# Patient Record
Sex: FEMALE | Race: BLACK OR AFRICAN AMERICAN | NOT HISPANIC OR LATINO | URBAN - METROPOLITAN AREA
[De-identification: names, ages, dates, MRNs, and addresses within clinical notes are randomized per-mention and may not be internally consistent; named-entity substitution may affect disease eponyms.]

---

## 2023-03-01 PROBLEM — I10 BENIGN ESSENTIAL HYPERTENSION: Status: ACTIVE | Noted: 2023-03-01

## 2023-03-01 PROBLEM — M75.80 ROTATOR CUFF TENDINITIS: Status: RESOLVED | Noted: 2023-03-01 | Resolved: 2023-03-01

## 2023-03-01 PROBLEM — E78.00 HYPERCHOLESTEROLEMIA: Status: ACTIVE | Noted: 2023-03-01

## 2023-03-01 PROBLEM — L25.9 DERMATITIS VENENATA: Status: RESOLVED | Noted: 2023-03-01 | Resolved: 2023-03-01

## 2023-03-01 PROBLEM — L50.8 URTICARIA, CHRONIC: Status: RESOLVED | Noted: 2023-03-01 | Resolved: 2023-03-01

## 2023-03-01 RX ORDER — AMLODIPINE BESYLATE 5 MG/1
1 TABLET ORAL DAILY
COMMUNITY
Start: 2016-03-31 | End: 2023-04-03 | Stop reason: SDUPTHER

## 2023-03-01 RX ORDER — NYSTATIN 100000 U/G
1 CREAM TOPICAL 2 TIMES DAILY
COMMUNITY
Start: 2022-04-04 | End: 2023-04-03 | Stop reason: SDUPTHER

## 2023-03-01 RX ORDER — POLYETHYLENE GLYCOL 3350, SODIUM CHLORIDE, SODIUM BICARBONATE, POTASSIUM CHLORIDE 420; 11.2; 5.72; 1.48 G/4L; G/4L; G/4L; G/4L
4000 POWDER, FOR SOLUTION ORAL ONCE
COMMUNITY
Start: 2022-06-04 | End: 2023-10-23

## 2023-03-01 RX ORDER — MULTIVITAMIN
1 TABLET ORAL DAILY
COMMUNITY

## 2023-03-01 RX ORDER — TRIAMCINOLONE ACETONIDE 1 MG/G
1 OINTMENT TOPICAL 2 TIMES DAILY
COMMUNITY
Start: 2022-04-04 | End: 2023-04-03 | Stop reason: SDUPTHER

## 2023-03-01 RX ORDER — CHOLECALCIFEROL (VITAMIN D3) 25 MCG
1 TABLET ORAL DAILY
COMMUNITY

## 2023-04-03 ENCOUNTER — OFFICE VISIT (OUTPATIENT)
Dept: PRIMARY CARE | Facility: CLINIC | Age: 68
End: 2023-04-03
Payer: MEDICARE

## 2023-04-03 ENCOUNTER — LAB (OUTPATIENT)
Dept: LAB | Facility: LAB | Age: 68
End: 2023-04-03
Payer: MEDICARE

## 2023-04-03 VITALS
TEMPERATURE: 98.4 F | WEIGHT: 138 LBS | BODY MASS INDEX: 23.56 KG/M2 | HEIGHT: 64 IN | SYSTOLIC BLOOD PRESSURE: 127 MMHG | DIASTOLIC BLOOD PRESSURE: 88 MMHG

## 2023-04-03 DIAGNOSIS — I10 BENIGN ESSENTIAL HYPERTENSION: Primary | ICD-10-CM

## 2023-04-03 DIAGNOSIS — Z00.00 ENCOUNTER FOR SUBSEQUENT ANNUAL WELLNESS VISIT (AWV) IN MEDICARE PATIENT: ICD-10-CM

## 2023-04-03 DIAGNOSIS — R21 FACIAL RASH: ICD-10-CM

## 2023-04-03 DIAGNOSIS — E78.00 HYPERCHOLESTEROLEMIA: ICD-10-CM

## 2023-04-03 DIAGNOSIS — I10 BENIGN ESSENTIAL HYPERTENSION: ICD-10-CM

## 2023-04-03 PROCEDURE — 3074F SYST BP LT 130 MM HG: CPT | Performed by: INTERNAL MEDICINE

## 2023-04-03 PROCEDURE — 3079F DIAST BP 80-89 MM HG: CPT | Performed by: INTERNAL MEDICINE

## 2023-04-03 PROCEDURE — 99213 OFFICE O/P EST LOW 20 MIN: CPT | Performed by: INTERNAL MEDICINE

## 2023-04-03 PROCEDURE — 85027 COMPLETE CBC AUTOMATED: CPT

## 2023-04-03 PROCEDURE — 1036F TOBACCO NON-USER: CPT | Performed by: INTERNAL MEDICINE

## 2023-04-03 PROCEDURE — 36415 COLL VENOUS BLD VENIPUNCTURE: CPT

## 2023-04-03 PROCEDURE — 80053 COMPREHEN METABOLIC PANEL: CPT

## 2023-04-03 PROCEDURE — G0439 PPPS, SUBSEQ VISIT: HCPCS | Performed by: INTERNAL MEDICINE

## 2023-04-03 PROCEDURE — 80061 LIPID PANEL: CPT

## 2023-04-03 PROCEDURE — 84443 ASSAY THYROID STIM HORMONE: CPT

## 2023-04-03 RX ORDER — CETIRIZINE HYDROCHLORIDE 5 MG/1
1 TABLET ORAL DAILY
COMMUNITY
End: 2023-10-23

## 2023-04-03 RX ORDER — AMLODIPINE BESYLATE 5 MG/1
5 TABLET ORAL DAILY
Qty: 90 TABLET | Refills: 3 | Status: SHIPPED | OUTPATIENT
Start: 2023-04-03 | End: 2024-04-01 | Stop reason: SDUPTHER

## 2023-04-03 RX ORDER — TRIAMCINOLONE ACETONIDE 1 MG/G
1 OINTMENT TOPICAL 2 TIMES DAILY
Qty: 20 G | Refills: 2 | Status: SHIPPED | OUTPATIENT
Start: 2023-04-03

## 2023-04-03 RX ORDER — NYSTATIN 100000 U/G
1 CREAM TOPICAL AS NEEDED
Qty: 20 G | Refills: 1 | Status: SHIPPED | OUTPATIENT
Start: 2023-04-03 | End: 2023-10-23

## 2023-04-03 RX ORDER — CYANOCOBALAMIN (VITAMIN B-12) 500 MCG
1 TABLET ORAL DAILY
COMMUNITY
End: 2023-10-23

## 2023-04-03 ASSESSMENT — ENCOUNTER SYMPTOMS
HEADACHES: 0
MYALGIAS: 0
BLOOD IN STOOL: 0
COUGH: 0
DEPRESSION: 0
CHILLS: 0
SHORTNESS OF BREATH: 0
PALPITATIONS: 0
ARTHRALGIAS: 0
DIZZINESS: 0
LOSS OF SENSATION IN FEET: 0
CONSTIPATION: 0
OCCASIONAL FEELINGS OF UNSTEADINESS: 0
DIARRHEA: 0
BACK PAIN: 0
FEVER: 0
FATIGUE: 0
NECK PAIN: 0
ABDOMINAL PAIN: 0

## 2023-04-03 NOTE — PATIENT INSTRUCTIONS
You are seen today for Medicare wellness visit  Please go to the lab for fasting blood work  Continue taking amlodipine  Continue with regular physical activity  Prevnar vaccine and Shingrix vaccine recommended

## 2023-04-03 NOTE — PROGRESS NOTES
Subjective   Patient ID: Richa Santoro is a 67 y.o. female.     HPI seen for wellness visit  She has hypertension and takes amlodipine.  No other health concerns today.    Mammogram 2022  Colonoscopy 2022  DEXA 2021  She lives with her     Review of Systems   Constitutional:  Negative for chills, fatigue and fever.   Respiratory:  Negative for cough and shortness of breath.    Cardiovascular:  Negative for chest pain, palpitations and leg swelling.   Gastrointestinal:  Negative for abdominal pain, blood in stool, constipation and diarrhea.   Endocrine: Negative for cold intolerance and heat intolerance.   Musculoskeletal:  Negative for arthralgias, back pain, myalgias and neck pain.   Neurological:  Negative for dizziness and headaches.       Objective   Physical Exam  Vitals reviewed.   Constitutional:       Appearance: Normal appearance.   HENT:      Head: Normocephalic and atraumatic.   Cardiovascular:      Rate and Rhythm: Normal rate and regular rhythm.   Pulmonary:      Effort: Pulmonary effort is normal. No respiratory distress.      Breath sounds: Normal breath sounds.   Abdominal:      General: Bowel sounds are normal. There is no distension.      Tenderness: There is no abdominal tenderness.   Musculoskeletal:         General: No swelling or tenderness. Normal range of motion.      Cervical back: Normal range of motion.   Skin:     General: Skin is warm.   Neurological:      General: No focal deficit present.      Mental Status: She is alert.   Psychiatric:         Mood and Affect: Mood normal.         Behavior: Behavior normal.         Assessment/Plan   Diagnoses and all orders for this visit:  Benign essential hypertension  Comments:  continue with amlodipine  Hypercholesterolemia  Encounter for subsequent annual wellness visit (AWV) in Medicare patient  Comments:  Due for Prevnar vaccine and Shingrix vaccine  Current on mammogram and colonoscopy

## 2023-04-04 LAB
ALANINE AMINOTRANSFERASE (SGPT) (U/L) IN SER/PLAS: 8 U/L (ref 7–45)
ALBUMIN (G/DL) IN SER/PLAS: 4.4 G/DL (ref 3.4–5)
ALKALINE PHOSPHATASE (U/L) IN SER/PLAS: 63 U/L (ref 33–136)
ANION GAP IN SER/PLAS: 16 MMOL/L (ref 10–20)
ASPARTATE AMINOTRANSFERASE (SGOT) (U/L) IN SER/PLAS: 17 U/L (ref 9–39)
BILIRUBIN TOTAL (MG/DL) IN SER/PLAS: 0.5 MG/DL (ref 0–1.2)
CALCIUM (MG/DL) IN SER/PLAS: 9.7 MG/DL (ref 8.6–10.6)
CARBON DIOXIDE, TOTAL (MMOL/L) IN SER/PLAS: 26 MMOL/L (ref 21–32)
CHLORIDE (MMOL/L) IN SER/PLAS: 103 MMOL/L (ref 98–107)
CHOLESTEROL (MG/DL) IN SER/PLAS: 213 MG/DL (ref 0–199)
CHOLESTEROL IN HDL (MG/DL) IN SER/PLAS: 80.7 MG/DL
CHOLESTEROL/HDL RATIO: 2.6
CREATININE (MG/DL) IN SER/PLAS: 0.79 MG/DL (ref 0.5–1.05)
ERYTHROCYTE DISTRIBUTION WIDTH (RATIO) BY AUTOMATED COUNT: 12.3 % (ref 11.5–14.5)
ERYTHROCYTE MEAN CORPUSCULAR HEMOGLOBIN CONCENTRATION (G/DL) BY AUTOMATED: 30.6 G/DL (ref 32–36)
ERYTHROCYTE MEAN CORPUSCULAR VOLUME (FL) BY AUTOMATED COUNT: 87 FL (ref 80–100)
ERYTHROCYTES (10*6/UL) IN BLOOD BY AUTOMATED COUNT: 4.76 X10E12/L (ref 4–5.2)
GFR FEMALE: 82 ML/MIN/1.73M2
GLUCOSE (MG/DL) IN SER/PLAS: 81 MG/DL (ref 74–99)
HEMATOCRIT (%) IN BLOOD BY AUTOMATED COUNT: 41.2 % (ref 36–46)
HEMOGLOBIN (G/DL) IN BLOOD: 12.6 G/DL (ref 12–16)
LDL: 119 MG/DL (ref 0–99)
LEUKOCYTES (10*3/UL) IN BLOOD BY AUTOMATED COUNT: 3.7 X10E9/L (ref 4.4–11.3)
NRBC (PER 100 WBCS) BY AUTOMATED COUNT: 0 /100 WBC (ref 0–0)
PLATELETS (10*3/UL) IN BLOOD AUTOMATED COUNT: 284 X10E9/L (ref 150–450)
POTASSIUM (MMOL/L) IN SER/PLAS: 4.5 MMOL/L (ref 3.5–5.3)
PROTEIN TOTAL: 7 G/DL (ref 6.4–8.2)
SODIUM (MMOL/L) IN SER/PLAS: 140 MMOL/L (ref 136–145)
THYROTROPIN (MIU/L) IN SER/PLAS BY DETECTION LIMIT <= 0.05 MIU/L: 0.63 MIU/L (ref 0.44–3.98)
TRIGLYCERIDE (MG/DL) IN SER/PLAS: 65 MG/DL (ref 0–149)
UREA NITROGEN (MG/DL) IN SER/PLAS: 12 MG/DL (ref 6–23)
VLDL: 13 MG/DL (ref 0–40)

## 2023-09-23 PROBLEM — G89.29 CHRONIC RIGHT SHOULDER PAIN: Status: ACTIVE | Noted: 2023-09-23

## 2023-09-23 PROBLEM — M25.511 CHRONIC RIGHT SHOULDER PAIN: Status: ACTIVE | Noted: 2023-09-23

## 2023-09-23 PROBLEM — M54.9 BACKACHE: Status: ACTIVE | Noted: 2023-09-23

## 2023-09-23 PROBLEM — L29.9 SCALP ITCH: Status: ACTIVE | Noted: 2023-09-23

## 2023-09-23 PROBLEM — L23.5 ALLERGIC CONTACT DERMATITIS DUE TO OTHER CHEMICAL PRODUCTS: Status: ACTIVE | Noted: 2023-09-23

## 2023-09-23 PROBLEM — R21 RASH AND OTHER NONSPECIFIC SKIN ERUPTION: Status: ACTIVE | Noted: 2022-02-16

## 2023-09-23 PROBLEM — Z78.0 ASYMPTOMATIC MENOPAUSE: Status: ACTIVE | Noted: 2023-09-23

## 2023-09-23 RX ORDER — KETOCONAZOLE 20 MG/G
1 CREAM TOPICAL
COMMUNITY
Start: 2022-02-24

## 2023-09-23 RX ORDER — TACROLIMUS 0.3 MG/G
1 OINTMENT TOPICAL
COMMUNITY
Start: 2020-08-21

## 2023-09-23 RX ORDER — CETIRIZINE HYDROCHLORIDE 10 MG/1
10 TABLET ORAL AS NEEDED
COMMUNITY

## 2023-09-23 RX ORDER — PIMECROLIMUS 10 MG/G
1 CREAM TOPICAL
COMMUNITY
Start: 2022-02-15

## 2023-10-23 ENCOUNTER — OFFICE VISIT (OUTPATIENT)
Dept: OBSTETRICS AND GYNECOLOGY | Facility: CLINIC | Age: 68
End: 2023-10-23
Payer: MEDICARE

## 2023-10-23 VITALS
WEIGHT: 138 LBS | HEIGHT: 65 IN | SYSTOLIC BLOOD PRESSURE: 126 MMHG | BODY MASS INDEX: 22.99 KG/M2 | DIASTOLIC BLOOD PRESSURE: 80 MMHG

## 2023-10-23 DIAGNOSIS — Z78.0 ASYMPTOMATIC MENOPAUSE: ICD-10-CM

## 2023-10-23 DIAGNOSIS — Z01.419 ENCOUNTER FOR GYNECOLOGICAL EXAMINATION WITHOUT ABNORMAL FINDING: Primary | ICD-10-CM

## 2023-10-23 PROCEDURE — 1036F TOBACCO NON-USER: CPT | Performed by: OBSTETRICS & GYNECOLOGY

## 2023-10-23 PROCEDURE — 3079F DIAST BP 80-89 MM HG: CPT | Performed by: OBSTETRICS & GYNECOLOGY

## 2023-10-23 PROCEDURE — 3074F SYST BP LT 130 MM HG: CPT | Performed by: OBSTETRICS & GYNECOLOGY

## 2023-10-23 PROCEDURE — 1159F MED LIST DOCD IN RCRD: CPT | Performed by: OBSTETRICS & GYNECOLOGY

## 2023-10-23 PROCEDURE — G0101 CA SCREEN;PELVIC/BREAST EXAM: HCPCS | Performed by: OBSTETRICS & GYNECOLOGY

## 2023-10-23 PROCEDURE — 1160F RVW MEDS BY RX/DR IN RCRD: CPT | Performed by: OBSTETRICS & GYNECOLOGY

## 2023-10-23 PROCEDURE — 88175 CYTOPATH C/V AUTO FLUID REDO: CPT

## 2023-10-23 NOTE — PROGRESS NOTES
Subjective   Richa Santoro is a 68 y.o. female here for GYN care. Current complaints: She is menopausal, no postmenopausal bleeding or discharge.  No dysuria or change in her bowel habits.  She has prolapse.  She is current on her colonoscopy.. Personal health questionnaire reviewed: yes.     Gynecologic History  Patient's last menstrual period was 01/01/2012 (approximate).  Contraception: post menopausal status  Last Pap: 7/2020. Results were: normal  Last mammogram: 6/15/23. Results were: normal    Obstetric History  OB History   No obstetric history on file.       Objective   Constitutional: Alert and in no acute distress. Well developed, well nourished.   Head and Face: Head and face: Normal.    Eyes: Normal external exam - nonicteric sclera, extraocular movements intact (EOMI) and no ptosis.   Neck: No neck asymmetry. Supple. Thyroid not enlarged and there were no palpable thyroid nodules.    Pulmonary: No respiratory distress.   Chest: Breasts: Normal appearance, no nipple discharge and no skin changes. Palpation of breasts and axillae: No palpable mass and no axillary lymphadenopathy.   Abdomen: Soft nontender; no abdominal mass palpated. No organomegaly. No hernias.   Genitourinary: External genitalia: Normal. No inguinal lymphadenopathy. Bartholin's Urethral and Skenes Glands: Normal. Urethra: Normal.  Bladder: Normal on palpation. Vagina: Normal. Cervix: Normal. A pap was sent. Uterus: Normal.  Right Adnexa/parametria: Normal.  Left Adnexa/parametria: Normal.  Inspection of Perianal Area: Normal.   Musculoskeletal: No joint swelling seen, normal movements of all extremities.   Skin: Normal skin color and pigmentation, normal skin turgor, and no rash.   Neurologic: Non-focal. Grossly intact.   Psychiatric: Alert and oriented x 3. Affect normal to patient baseline. Mood: Appropriate.  Physical Exam     Assessment/Plan   Healthy female exam.  This is a 68-year-old female with a normal exam.  A Pap smear was  sent, we discussed likely no further Pap smears will be necessary.  Her routine mammogram was ordered with tomosynthesis.  She is current on her colonoscopy.  I will see her in 2 years, or sooner as needed.  Mammogram ordered.

## 2023-11-07 LAB
CYTOLOGY CMNT CVX/VAG CYTO-IMP: NORMAL
LAB AP HPV GENOTYPE QUESTION: YES
LAB AP HPV HR: NORMAL
LABORATORY COMMENT REPORT: NORMAL
LMP START DATE: NORMAL
MENSTRUAL HX REPORTED: NORMAL
PATH REPORT.TOTAL CANCER: NORMAL

## 2024-04-01 ENCOUNTER — LAB (OUTPATIENT)
Dept: LAB | Facility: LAB | Age: 69
End: 2024-04-01
Payer: MEDICARE

## 2024-04-01 ENCOUNTER — OFFICE VISIT (OUTPATIENT)
Dept: PRIMARY CARE | Facility: CLINIC | Age: 69
End: 2024-04-01
Payer: MEDICARE

## 2024-04-01 VITALS
HEART RATE: 90 BPM | SYSTOLIC BLOOD PRESSURE: 138 MMHG | HEIGHT: 65 IN | WEIGHT: 135 LBS | BODY MASS INDEX: 22.49 KG/M2 | DIASTOLIC BLOOD PRESSURE: 88 MMHG | TEMPERATURE: 96.9 F

## 2024-04-01 DIAGNOSIS — I10 BENIGN ESSENTIAL HYPERTENSION: Primary | ICD-10-CM

## 2024-04-01 DIAGNOSIS — Z78.0 MENOPAUSE: ICD-10-CM

## 2024-04-01 DIAGNOSIS — E78.00 HYPERCHOLESTEROLEMIA: ICD-10-CM

## 2024-04-01 DIAGNOSIS — I10 BENIGN ESSENTIAL HYPERTENSION: ICD-10-CM

## 2024-04-01 DIAGNOSIS — Z00.00 MEDICARE ANNUAL WELLNESS VISIT, SUBSEQUENT: ICD-10-CM

## 2024-04-01 LAB
ALBUMIN SERPL BCP-MCNC: 4.4 G/DL (ref 3.4–5)
ALP SERPL-CCNC: 64 U/L (ref 33–136)
ALT SERPL W P-5'-P-CCNC: 11 U/L (ref 7–45)
ANION GAP SERPL CALC-SCNC: 15 MMOL/L (ref 10–20)
AST SERPL W P-5'-P-CCNC: 19 U/L (ref 9–39)
BILIRUB SERPL-MCNC: 0.4 MG/DL (ref 0–1.2)
BUN SERPL-MCNC: 12 MG/DL (ref 6–23)
CALCIUM SERPL-MCNC: 9.6 MG/DL (ref 8.6–10.6)
CHLORIDE SERPL-SCNC: 105 MMOL/L (ref 98–107)
CHOLEST SERPL-MCNC: 222 MG/DL (ref 0–199)
CHOLESTEROL/HDL RATIO: 2.7
CO2 SERPL-SCNC: 25 MMOL/L (ref 21–32)
CREAT SERPL-MCNC: 0.79 MG/DL (ref 0.5–1.05)
EGFRCR SERPLBLD CKD-EPI 2021: 82 ML/MIN/1.73M*2
ERYTHROCYTE [DISTWIDTH] IN BLOOD BY AUTOMATED COUNT: 12.9 % (ref 11.5–14.5)
GLUCOSE SERPL-MCNC: 85 MG/DL (ref 74–99)
HCT VFR BLD AUTO: 39.9 % (ref 36–46)
HCV AB SER QL: NONREACTIVE
HDLC SERPL-MCNC: 82.9 MG/DL
HGB BLD-MCNC: 12.4 G/DL (ref 12–16)
LDLC SERPL CALC-MCNC: 128 MG/DL
MCH RBC QN AUTO: 26.5 PG (ref 26–34)
MCHC RBC AUTO-ENTMCNC: 31.1 G/DL (ref 32–36)
MCV RBC AUTO: 85 FL (ref 80–100)
NON HDL CHOLESTEROL: 139 MG/DL (ref 0–149)
NRBC BLD-RTO: 0 /100 WBCS (ref 0–0)
PLATELET # BLD AUTO: 272 X10*3/UL (ref 150–450)
POTASSIUM SERPL-SCNC: 4.3 MMOL/L (ref 3.5–5.3)
PROT SERPL-MCNC: 7.2 G/DL (ref 6.4–8.2)
RBC # BLD AUTO: 4.68 X10*6/UL (ref 4–5.2)
SODIUM SERPL-SCNC: 141 MMOL/L (ref 136–145)
TRIGL SERPL-MCNC: 55 MG/DL (ref 0–149)
TSH SERPL-ACNC: 1.29 MIU/L (ref 0.44–3.98)
VLDL: 11 MG/DL (ref 0–40)
WBC # BLD AUTO: 3.6 X10*3/UL (ref 4.4–11.3)

## 2024-04-01 PROCEDURE — 1160F RVW MEDS BY RX/DR IN RCRD: CPT | Performed by: INTERNAL MEDICINE

## 2024-04-01 PROCEDURE — 1170F FXNL STATUS ASSESSED: CPT | Performed by: INTERNAL MEDICINE

## 2024-04-01 PROCEDURE — 3075F SYST BP GE 130 - 139MM HG: CPT | Performed by: INTERNAL MEDICINE

## 2024-04-01 PROCEDURE — 99213 OFFICE O/P EST LOW 20 MIN: CPT | Performed by: INTERNAL MEDICINE

## 2024-04-01 PROCEDURE — 36415 COLL VENOUS BLD VENIPUNCTURE: CPT

## 2024-04-01 PROCEDURE — 85027 COMPLETE CBC AUTOMATED: CPT

## 2024-04-01 PROCEDURE — 1159F MED LIST DOCD IN RCRD: CPT | Performed by: INTERNAL MEDICINE

## 2024-04-01 PROCEDURE — 1036F TOBACCO NON-USER: CPT | Performed by: INTERNAL MEDICINE

## 2024-04-01 PROCEDURE — G0439 PPPS, SUBSEQ VISIT: HCPCS | Performed by: INTERNAL MEDICINE

## 2024-04-01 PROCEDURE — 80061 LIPID PANEL: CPT

## 2024-04-01 PROCEDURE — 3079F DIAST BP 80-89 MM HG: CPT | Performed by: INTERNAL MEDICINE

## 2024-04-01 PROCEDURE — 80053 COMPREHEN METABOLIC PANEL: CPT

## 2024-04-01 PROCEDURE — 86803 HEPATITIS C AB TEST: CPT

## 2024-04-01 PROCEDURE — 84443 ASSAY THYROID STIM HORMONE: CPT

## 2024-04-01 RX ORDER — AMLODIPINE BESYLATE 5 MG/1
5 TABLET ORAL DAILY
Qty: 90 TABLET | Refills: 3 | Status: SHIPPED | OUTPATIENT
Start: 2024-04-01

## 2024-04-01 ASSESSMENT — ENCOUNTER SYMPTOMS
ARTHRALGIAS: 0
OCCASIONAL FEELINGS OF UNSTEADINESS: 0
CHILLS: 0
SORE THROAT: 0
UNEXPECTED WEIGHT CHANGE: 0
BLOOD IN STOOL: 0
NERVOUS/ANXIOUS: 0
WEAKNESS: 0
BACK PAIN: 0
LIGHT-HEADEDNESS: 0
COUGH: 0
FREQUENCY: 0
WHEEZING: 0
SLEEP DISTURBANCE: 0
DEPRESSION: 0
DIZZINESS: 0
SHORTNESS OF BREATH: 0
DECREASED CONCENTRATION: 0
DYSURIA: 0
DIFFICULTY URINATING: 0
ABDOMINAL PAIN: 0
LOSS OF SENSATION IN FEET: 0
NECK PAIN: 0
PALPITATIONS: 0
ACTIVITY CHANGE: 0
FLANK PAIN: 0
APPETITE CHANGE: 0
CHEST TIGHTNESS: 0
HEADACHES: 0

## 2024-04-01 ASSESSMENT — ACTIVITIES OF DAILY LIVING (ADL)
DRESSING: INDEPENDENT
MANAGING_FINANCES: INDEPENDENT
GROCERY_SHOPPING: INDEPENDENT
DOING_HOUSEWORK: INDEPENDENT
TAKING_MEDICATION: INDEPENDENT
BATHING: INDEPENDENT

## 2024-04-01 ASSESSMENT — PATIENT HEALTH QUESTIONNAIRE - PHQ9
2. FEELING DOWN, DEPRESSED OR HOPELESS: NOT AT ALL
1. LITTLE INTEREST OR PLEASURE IN DOING THINGS: NOT AT ALL
SUM OF ALL RESPONSES TO PHQ9 QUESTIONS 1 AND 2: 0

## 2024-04-01 NOTE — PROGRESS NOTES
"Subjective   Patient ID: Richa Santoro is a 68 y.o. female who presents for Medicare Annual Wellness Visit Subsequent (Pt present today for wellness visit. ls).    HPI: Patient is seen today for wellness visit.  Her medical history is significant for hypertension.  She takes amlodipine daily.  She is physically active and eats a healthy diet.  She denies any health concerns today.  Mammogram-2023  Colonoscopy-2022  Pap smear 2023  She lives with her  and retired.    Review of Systems   Constitutional:  Negative for activity change, appetite change, chills and unexpected weight change.   HENT:  Negative for congestion, postnasal drip and sore throat.    Eyes:  Negative for visual disturbance.   Respiratory:  Negative for cough, chest tightness, shortness of breath and wheezing.    Cardiovascular:  Negative for chest pain, palpitations and leg swelling.   Gastrointestinal:  Negative for abdominal pain and blood in stool.   Endocrine: Negative for cold intolerance and heat intolerance.   Genitourinary:  Negative for difficulty urinating, dysuria, flank pain and frequency.   Musculoskeletal:  Negative for arthralgias, back pain, gait problem and neck pain.        Foot pain on standing   Skin:  Negative for rash.   Allergic/Immunologic: Negative for environmental allergies and food allergies.   Neurological:  Negative for dizziness, weakness, light-headedness and headaches.   Psychiatric/Behavioral:  Negative for decreased concentration and sleep disturbance. The patient is not nervous/anxious.        Objective   /88 (BP Location: Right arm, Patient Position: Sitting)   Pulse 90   Temp 36.1 °C (96.9 °F)   Ht 1.651 m (5' 5\")   Wt 61.2 kg (135 lb)   LMP 01/01/2012 (Approximate)   BMI 22.47 kg/m²     Physical Exam  Vitals reviewed.   Constitutional:       General: She is not in acute distress.     Appearance: Normal appearance.   HENT:      Head: Normocephalic and atraumatic.   Cardiovascular:      Rate " and Rhythm: Normal rate and regular rhythm.      Pulses: Normal pulses.   Pulmonary:      Effort: Pulmonary effort is normal. No respiratory distress.      Breath sounds: Normal breath sounds.   Abdominal:      General: Bowel sounds are normal. There is no distension.      Tenderness: There is no abdominal tenderness.   Musculoskeletal:         General: No swelling or tenderness. Normal range of motion.      Cervical back: Normal range of motion.   Skin:     General: Skin is warm.   Neurological:      General: No focal deficit present.      Mental Status: She is alert.      Coordination: Coordination normal.      Gait: Gait normal.   Psychiatric:         Mood and Affect: Mood normal.         Behavior: Behavior normal.         Assessment/Plan   Diagnoses and all orders for this visit:  Benign essential hypertension  Comments:  Blood pressure fairly controlled  Continue amlodipine and monitor at home  Exercise regularly and eat healthy diet  Orders:  -     Comprehensive Metabolic Panel; Future  -     amLODIPine (Norvasc) 5 mg tablet; Take 1 tablet (5 mg) by mouth once daily.  Medicare annual wellness visit, subsequent  Comments:  Wellness visit completed  Bone density ordered  Blood work ordered  Orders:  -     Hepatitis C antibody; Future  Hypercholesterolemia  Comments:  Continue with healthy diet and exercise regularly  Orders:  -     CBC; Future  -     Comprehensive Metabolic Panel; Future  -     Lipid Panel; Future  -     TSH with reflex to Free T4 if abnormal; Future  Menopause  Comments:  DEXA ordered  Orders:  -     XR DEXA bone density; Future

## 2024-04-02 ENCOUNTER — TELEPHONE (OUTPATIENT)
Dept: PRIMARY CARE | Facility: CLINIC | Age: 69
End: 2024-04-02
Payer: MEDICARE

## 2024-04-02 NOTE — TELEPHONE ENCOUNTER
----- Message from Merritt Mcdonald MD sent at 4/2/2024  9:40 AM EDT -----  Blood work results are normal

## 2024-06-17 ENCOUNTER — APPOINTMENT (OUTPATIENT)
Dept: RADIOLOGY | Facility: CLINIC | Age: 69
End: 2024-06-17
Payer: MEDICARE

## 2024-06-24 ENCOUNTER — HOSPITAL ENCOUNTER (OUTPATIENT)
Dept: RADIOLOGY | Facility: CLINIC | Age: 69
Discharge: HOME | End: 2024-06-24
Payer: MEDICARE

## 2024-06-24 VITALS — HEIGHT: 65 IN | WEIGHT: 134.92 LBS | BODY MASS INDEX: 22.48 KG/M2

## 2024-06-24 PROCEDURE — 77063 BREAST TOMOSYNTHESIS BI: CPT | Mod: BILATERAL PROCEDURE | Performed by: RADIOLOGY

## 2024-06-24 PROCEDURE — 77067 SCR MAMMO BI INCL CAD: CPT | Mod: BILATERAL PROCEDURE | Performed by: RADIOLOGY

## 2024-06-24 PROCEDURE — 77067 SCR MAMMO BI INCL CAD: CPT

## 2024-07-26 ENCOUNTER — HOSPITAL ENCOUNTER (OUTPATIENT)
Dept: RADIOLOGY | Facility: CLINIC | Age: 69
Discharge: HOME | End: 2024-07-26
Payer: MEDICARE

## 2024-07-26 DIAGNOSIS — Z78.0 MENOPAUSE: ICD-10-CM

## 2024-07-26 PROCEDURE — 77080 DXA BONE DENSITY AXIAL: CPT

## 2024-07-26 ASSESSMENT — LIFESTYLE VARIABLES
3_OR_MORE_DRINKS_PER_DAY: N
CURRENT_SMOKER: N

## 2024-09-18 ASSESSMENT — ENCOUNTER SYMPTOMS
DIARRHEA: 0
ARTHRALGIAS: 0
DYSURIA: 0
FLATUS: 0
ANOREXIA: 0
HEADACHES: 0
HEMATOCHEZIA: 0
BELCHING: 1
WEIGHT LOSS: 0
HEMATURIA: 0
CONSTIPATION: 1
MYALGIAS: 0
FREQUENCY: 0
NAUSEA: 0
FEVER: 0
ABDOMINAL PAIN: 1
VOMITING: 0

## 2024-09-23 ENCOUNTER — APPOINTMENT (OUTPATIENT)
Dept: PRIMARY CARE | Facility: CLINIC | Age: 69
End: 2024-09-23
Payer: MEDICARE

## 2024-09-23 VITALS
WEIGHT: 138 LBS | BODY MASS INDEX: 22.96 KG/M2 | TEMPERATURE: 97.8 F | SYSTOLIC BLOOD PRESSURE: 125 MMHG | DIASTOLIC BLOOD PRESSURE: 80 MMHG

## 2024-09-23 DIAGNOSIS — I10 BENIGN ESSENTIAL HYPERTENSION: Primary | ICD-10-CM

## 2024-09-23 DIAGNOSIS — R10.32 LEFT LOWER QUADRANT ABDOMINAL PAIN: ICD-10-CM

## 2024-09-23 DIAGNOSIS — R21 FACIAL RASH: ICD-10-CM

## 2024-09-23 DIAGNOSIS — E78.00 HYPERCHOLESTEROLEMIA: ICD-10-CM

## 2024-09-23 LAB
POC APPEARANCE, URINE: ABNORMAL
POC BILIRUBIN, URINE: NEGATIVE
POC BLOOD, URINE: NEGATIVE
POC COLOR, URINE: YELLOW
POC GLUCOSE, URINE: NEGATIVE MG/DL
POC KETONES, URINE: NEGATIVE MG/DL
POC LEUKOCYTES, URINE: NEGATIVE
POC NITRITE,URINE: NEGATIVE
POC PH, URINE: 7.5 PH
POC PROTEIN, URINE: NEGATIVE MG/DL
POC SPECIFIC GRAVITY, URINE: 1.01
POC UROBILINOGEN, URINE: 0.2 EU/DL

## 2024-09-23 PROCEDURE — 81002 URINALYSIS NONAUTO W/O SCOPE: CPT | Performed by: INTERNAL MEDICINE

## 2024-09-23 PROCEDURE — 3074F SYST BP LT 130 MM HG: CPT | Performed by: INTERNAL MEDICINE

## 2024-09-23 PROCEDURE — 99214 OFFICE O/P EST MOD 30 MIN: CPT | Performed by: INTERNAL MEDICINE

## 2024-09-23 PROCEDURE — 3079F DIAST BP 80-89 MM HG: CPT | Performed by: INTERNAL MEDICINE

## 2024-09-23 RX ORDER — TRIAMCINOLONE ACETONIDE 1 MG/G
1 OINTMENT TOPICAL 2 TIMES DAILY
Qty: 20 G | Refills: 2 | Status: SHIPPED | OUTPATIENT
Start: 2024-09-23

## 2024-09-23 ASSESSMENT — ENCOUNTER SYMPTOMS
FATIGUE: 0
PALPITATIONS: 0
ROS GI COMMENTS: LOW ABD PAIN
NAUSEA: 0
DIZZINESS: 0
FREQUENCY: 0
CONSTIPATION: 1
WEAKNESS: 0
FEVER: 0
SORE THROAT: 0
NERVOUS/ANXIOUS: 0
ARTHRALGIAS: 0
NECK PAIN: 0
MYALGIAS: 0
CONFUSION: 0
DIARRHEA: 0
HEADACHES: 0
VOMITING: 0
COUGH: 0
SHORTNESS OF BREATH: 0
BACK PAIN: 0
DYSURIA: 0
SINUS PAIN: 0
HEMATURIA: 0
ABDOMINAL PAIN: 1
CHILLS: 0
BLOOD IN STOOL: 0

## 2024-09-23 NOTE — PROGRESS NOTES
Subjective   Patient ID: Richa Santoro is a 69 y.o. female who presents for No chief complaint on file..    HPI-patient is seen today for complaints of pain in her left lower quadrant like a burning pain for last few days.  She has frequent constipation and takes senna tea which helps with her symptoms.  No blood in her stools but she has noticed some mucus once.  No fever or chills.  Last colonoscopy 2 years ago-diverticulosis present  She has hypertension and takes amlodipine.  Review of Systems   Constitutional:  Negative for chills, fatigue and fever.   HENT:  Negative for congestion, sinus pain and sore throat.    Respiratory:  Negative for cough and shortness of breath.    Cardiovascular:  Negative for chest pain, palpitations and leg swelling.   Gastrointestinal:  Positive for abdominal pain and constipation. Negative for blood in stool, diarrhea, nausea and vomiting.        Low abd pain   Genitourinary:  Negative for dysuria, frequency and hematuria.   Musculoskeletal:  Negative for arthralgias, back pain, myalgias and neck pain.   Neurological:  Negative for dizziness, weakness and headaches.   Psychiatric/Behavioral:  Negative for confusion. The patient is not nervous/anxious.        Objective   /80 (BP Location: Right arm, Patient Position: Sitting)   Temp 36.6 °C (97.8 °F)   Wt 62.6 kg (138 lb)   LMP 01/01/2012 (Approximate)   BMI 22.96 kg/m²     Physical Exam  Vitals reviewed.   Constitutional:       General: She is not in acute distress.     Appearance: Normal appearance.   HENT:      Head: Normocephalic and atraumatic.   Cardiovascular:      Rate and Rhythm: Normal rate and regular rhythm.      Pulses: Normal pulses.   Pulmonary:      Effort: Pulmonary effort is normal. No respiratory distress.      Breath sounds: Normal breath sounds.   Abdominal:      General: Bowel sounds are normal. There is no distension.      Tenderness: There is no abdominal tenderness.   Musculoskeletal:          General: No swelling or tenderness. Normal range of motion.   Skin:     General: Skin is warm.   Neurological:      General: No focal deficit present.      Mental Status: She is alert.      Coordination: Coordination normal.      Gait: Gait normal.   Psychiatric:         Mood and Affect: Mood normal.         Behavior: Behavior normal.         Assessment/Plan   Diagnoses and all orders for this visit:  Benign essential hypertension  Comments:  Well-controlled  Continue amlodipine  Hypercholesterolemia  Comments:  Continue healthy diet and regular exercise  Left lower quadrant abdominal pain  Comments:  Urinalysis today normal  Get ultrasound of abdomen and pelvis  Orders:  -     US abdomen limited; Future  -     US pelvis limited; Future  -     POCT UA (nonautomated) manually resulted  Facial rash  Comments:  Refill triamcinolone cream-patient to use it sparingly  Orders:  -     triamcinolone (Kenalog) 0.1 % ointment; Apply 1 Application topically 2 times a day.         Answers submitted by the patient for this visit:  Abdominal Pain Questionnaire (Submitted on 9/18/2024)  Chief Complaint: Abdominal pain  Chronicity: new  Onset: 1 to 4 weeks ago  Onset quality: sudden  Frequency: every several days  Progression since onset: waxing and waning  Pain location: LLQ  Pain - numeric: 3/10  Pain quality: aching  Radiates to: LLQ  anorexia: No  belching: Yes  flatus: No  hematochezia: No  melena: No  weight loss: No  Aggravated by: nothing  Relieved by: bowel movements  Diagnostic workup: CT scan

## 2024-09-27 ENCOUNTER — HOSPITAL ENCOUNTER (OUTPATIENT)
Dept: RADIOLOGY | Facility: CLINIC | Age: 69
Discharge: HOME | End: 2024-09-27
Payer: MEDICARE

## 2024-09-27 DIAGNOSIS — R10.32 LEFT LOWER QUADRANT ABDOMINAL PAIN: ICD-10-CM

## 2024-09-27 DIAGNOSIS — R10.32 LEFT LOWER QUADRANT PAIN: ICD-10-CM

## 2024-09-27 PROCEDURE — 76830 TRANSVAGINAL US NON-OB: CPT

## 2024-09-27 PROCEDURE — 76705 ECHO EXAM OF ABDOMEN: CPT

## 2024-09-27 PROCEDURE — 76857 US EXAM PELVIC LIMITED: CPT

## 2024-10-01 ENCOUNTER — TELEPHONE (OUTPATIENT)
Dept: PRIMARY CARE | Facility: CLINIC | Age: 69
End: 2024-10-01
Payer: MEDICARE

## 2024-10-28 ENCOUNTER — APPOINTMENT (OUTPATIENT)
Dept: OBSTETRICS AND GYNECOLOGY | Facility: CLINIC | Age: 69
End: 2024-10-28
Payer: MEDICARE

## 2024-10-28 VITALS
WEIGHT: 137 LBS | SYSTOLIC BLOOD PRESSURE: 120 MMHG | BODY MASS INDEX: 23.39 KG/M2 | HEIGHT: 64 IN | DIASTOLIC BLOOD PRESSURE: 88 MMHG

## 2024-10-28 DIAGNOSIS — M85.80 OSTEOPENIA AFTER MENOPAUSE: ICD-10-CM

## 2024-10-28 DIAGNOSIS — Z78.0 OSTEOPENIA AFTER MENOPAUSE: ICD-10-CM

## 2024-10-28 DIAGNOSIS — Z01.419 ENCOUNTER FOR GYNECOLOGICAL EXAMINATION WITHOUT ABNORMAL FINDING: Primary | ICD-10-CM

## 2024-10-28 DIAGNOSIS — Z12.31 ENCOUNTER FOR SCREENING MAMMOGRAM FOR MALIGNANT NEOPLASM OF BREAST: ICD-10-CM

## 2025-04-21 ENCOUNTER — APPOINTMENT (OUTPATIENT)
Dept: PRIMARY CARE | Facility: CLINIC | Age: 70
End: 2025-04-21
Payer: MEDICARE

## 2025-04-21 VITALS
SYSTOLIC BLOOD PRESSURE: 122 MMHG | DIASTOLIC BLOOD PRESSURE: 83 MMHG | TEMPERATURE: 98.2 F | BODY MASS INDEX: 22.88 KG/M2 | WEIGHT: 134 LBS | HEIGHT: 64 IN

## 2025-04-21 DIAGNOSIS — R94.31 ABNORMAL EKG: ICD-10-CM

## 2025-04-21 DIAGNOSIS — Z00.00 MEDICARE ANNUAL WELLNESS VISIT, SUBSEQUENT: Primary | ICD-10-CM

## 2025-04-21 DIAGNOSIS — I10 BENIGN ESSENTIAL HYPERTENSION: ICD-10-CM

## 2025-04-21 DIAGNOSIS — R73.09 ELEVATED GLUCOSE: ICD-10-CM

## 2025-04-21 DIAGNOSIS — E78.00 HYPERCHOLESTEROLEMIA: ICD-10-CM

## 2025-04-21 PROCEDURE — 1159F MED LIST DOCD IN RCRD: CPT | Performed by: INTERNAL MEDICINE

## 2025-04-21 PROCEDURE — 1160F RVW MEDS BY RX/DR IN RCRD: CPT | Performed by: INTERNAL MEDICINE

## 2025-04-21 PROCEDURE — 1170F FXNL STATUS ASSESSED: CPT | Performed by: INTERNAL MEDICINE

## 2025-04-21 PROCEDURE — 3074F SYST BP LT 130 MM HG: CPT | Performed by: INTERNAL MEDICINE

## 2025-04-21 PROCEDURE — 1123F ACP DISCUSS/DSCN MKR DOCD: CPT | Performed by: INTERNAL MEDICINE

## 2025-04-21 PROCEDURE — 1036F TOBACCO NON-USER: CPT | Performed by: INTERNAL MEDICINE

## 2025-04-21 PROCEDURE — 99214 OFFICE O/P EST MOD 30 MIN: CPT | Performed by: INTERNAL MEDICINE

## 2025-04-21 PROCEDURE — 3079F DIAST BP 80-89 MM HG: CPT | Performed by: INTERNAL MEDICINE

## 2025-04-21 PROCEDURE — 3008F BODY MASS INDEX DOCD: CPT | Performed by: INTERNAL MEDICINE

## 2025-04-21 PROCEDURE — G0439 PPPS, SUBSEQ VISIT: HCPCS | Performed by: INTERNAL MEDICINE

## 2025-04-21 PROCEDURE — 93000 ELECTROCARDIOGRAM COMPLETE: CPT | Performed by: INTERNAL MEDICINE

## 2025-04-21 RX ORDER — AMLODIPINE BESYLATE 5 MG/1
5 TABLET ORAL DAILY
Qty: 90 TABLET | Refills: 3 | Status: SHIPPED | OUTPATIENT
Start: 2025-04-21

## 2025-04-21 ASSESSMENT — PATIENT HEALTH QUESTIONNAIRE - PHQ9
SUM OF ALL RESPONSES TO PHQ9 QUESTIONS 1 AND 2: 0
2. FEELING DOWN, DEPRESSED OR HOPELESS: NOT AT ALL
1. LITTLE INTEREST OR PLEASURE IN DOING THINGS: NOT AT ALL

## 2025-04-21 ASSESSMENT — ACTIVITIES OF DAILY LIVING (ADL)
DOING_HOUSEWORK: INDEPENDENT
MANAGING_FINANCES: INDEPENDENT
TAKING_MEDICATION: INDEPENDENT
GROCERY_SHOPPING: INDEPENDENT
DRESSING: INDEPENDENT
BATHING: INDEPENDENT

## 2025-04-21 ASSESSMENT — ENCOUNTER SYMPTOMS
COUGH: 0
NERVOUS/ANXIOUS: 0
WEAKNESS: 0
CHILLS: 0
FATIGUE: 0
CONSTIPATION: 0
LOSS OF SENSATION IN FEET: 0
FEVER: 0
BLOOD IN STOOL: 0
SORE THROAT: 0
MYALGIAS: 0
ARTHRALGIAS: 0
HEADACHES: 0
DYSURIA: 0
OCCASIONAL FEELINGS OF UNSTEADINESS: 0
NECK PAIN: 0
SHORTNESS OF BREATH: 0
FREQUENCY: 0
CONFUSION: 0
DIARRHEA: 0
ABDOMINAL PAIN: 0
DIZZINESS: 0
SINUS PAIN: 0
DEPRESSION: 0
PALPITATIONS: 0
BACK PAIN: 0

## 2025-04-21 NOTE — PROGRESS NOTES
"Subjective   Patient ID: Richa Santoro is a 69 y.o. female who presents for Medicare Annual Wellness Visit Subsequent (Pt present today for wellness visit. ls).    HPI Ms. Ferrera is 69-year-old female seen in office today for Medicare wellness visit.  Her medical history significant for hypertension, hyperlipidemia.  She takes amlodipine daily and would like to get a refill.  She exercises regularly-1 hour - 4 days a week at home.  She denies any health concerns.  She eats a very healthy diet.  Denies any injuries or falls.  Mammogram-9/2024  Colonoscopy-2024  [id number]    Review of Systems   Constitutional:  Negative for chills, fatigue and fever.   HENT:  Negative for congestion, sinus pain and sore throat.    Respiratory:  Negative for cough and shortness of breath.    Cardiovascular:  Negative for chest pain, palpitations and leg swelling.   Gastrointestinal:  Negative for abdominal pain, blood in stool, constipation and diarrhea.   Genitourinary:  Negative for dysuria and frequency.   Musculoskeletal:  Negative for arthralgias, back pain, myalgias and neck pain.   Neurological:  Negative for dizziness, weakness and headaches.   Psychiatric/Behavioral:  Negative for confusion. The patient is not nervous/anxious.        Objective   /83 (BP Location: Right arm, Patient Position: Sitting)   Temp 36.8 °C (98.2 °F)   Ht 1.626 m (5' 4\")   Wt 60.8 kg (134 lb)   LMP 01/01/2012 (Approximate)   BMI 23.00 kg/m²     Physical Exam  Vitals reviewed.   Constitutional:       General: She is not in acute distress.     Appearance: Normal appearance.   HENT:      Head: Normocephalic and atraumatic.      Right Ear: Tympanic membrane and ear canal normal.      Left Ear: Tympanic membrane and ear canal normal.      Mouth/Throat:      Mouth: Mucous membranes are moist.      Pharynx: No posterior oropharyngeal erythema.   Eyes:      Extraocular Movements: Extraocular movements intact.      Pupils: Pupils are " equal, round, and reactive to light.   Cardiovascular:      Rate and Rhythm: Normal rate and regular rhythm.      Pulses: Normal pulses.   Pulmonary:      Effort: Pulmonary effort is normal. No respiratory distress.      Breath sounds: Normal breath sounds.   Abdominal:      General: Bowel sounds are normal. There is no distension.      Tenderness: There is no abdominal tenderness.   Musculoskeletal:         General: No swelling or tenderness. Normal range of motion.      Cervical back: Normal range of motion.   Skin:     General: Skin is warm.   Neurological:      General: No focal deficit present.      Mental Status: She is alert and oriented to person, place, and time.      Coordination: Coordination normal.      Gait: Gait normal.   Psychiatric:         Mood and Affect: Mood normal.         Behavior: Behavior normal.         Assessment/Plan   Diagnoses and all orders for this visit:  Medicare annual wellness visit, subsequent  Comments:  Medicare wellness visit completed  Blood work ordered  EKG today  Benign essential hypertension  Comments:  Blood pressure well controlled  Continue amlodipine and monitor at home  Exercise regularly and eat healthy diet  Orders:  -     amLODIPine (Norvasc) 5 mg tablet; Take 1 tablet (5 mg) by mouth once daily.  -     ECG 12 lead (Clinic Performed)  -     CBC; Future  -     Comprehensive Metabolic Panel; Future  -     TSH with reflex to Free T4 if abnormal; Future  Hypercholesterolemia  Comments:  Continue healthy diet and regular exercise  Check lipid panel  Orders:  -     Lipid Panel; Future  Elevated glucose  Comments:  Check hemoglobin A1c for diabetes screening  Orders:  -     Hemoglobin A1C; Future  Abnormal EKG-refer to cardiology

## 2025-04-21 NOTE — PATIENT INSTRUCTIONS
You are seen today for Medicare wellness visit  Your blood pressure is well-controlled  Continue current medication  Continue healthy diet and regular exercise  Fasting blood work is ordered  Follow-up yearly or sooner if needed

## 2025-04-22 ENCOUNTER — TELEPHONE (OUTPATIENT)
Dept: PRIMARY CARE | Facility: CLINIC | Age: 70
End: 2025-04-22
Payer: MEDICARE

## 2025-04-22 LAB
ALBUMIN SERPL-MCNC: 4.7 G/DL (ref 3.6–5.1)
ALP SERPL-CCNC: 78 U/L (ref 37–153)
ALT SERPL-CCNC: 10 U/L (ref 6–29)
ANION GAP SERPL CALCULATED.4IONS-SCNC: 8 MMOL/L (CALC) (ref 7–17)
AST SERPL-CCNC: 18 U/L (ref 10–35)
BILIRUB SERPL-MCNC: 0.5 MG/DL (ref 0.2–1.2)
BUN SERPL-MCNC: 10 MG/DL (ref 7–25)
CALCIUM SERPL-MCNC: 9.8 MG/DL (ref 8.6–10.4)
CHLORIDE SERPL-SCNC: 102 MMOL/L (ref 98–110)
CHOLEST SERPL-MCNC: 247 MG/DL
CHOLEST/HDLC SERPL: 2.9 (CALC)
CO2 SERPL-SCNC: 29 MMOL/L (ref 20–32)
CREAT SERPL-MCNC: 0.78 MG/DL (ref 0.5–1.05)
EGFRCR SERPLBLD CKD-EPI 2021: 82 ML/MIN/1.73M2
ERYTHROCYTE [DISTWIDTH] IN BLOOD BY AUTOMATED COUNT: 11.9 % (ref 11–15)
EST. AVERAGE GLUCOSE BLD GHB EST-MCNC: 114 MG/DL
EST. AVERAGE GLUCOSE BLD GHB EST-SCNC: 6.3 MMOL/L
GLUCOSE SERPL-MCNC: 80 MG/DL (ref 65–99)
HBA1C MFR BLD: 5.6 %
HCT VFR BLD AUTO: 40.9 % (ref 35–45)
HDLC SERPL-MCNC: 84 MG/DL
HGB BLD-MCNC: 12.9 G/DL (ref 11.7–15.5)
LDLC SERPL CALC-MCNC: 146 MG/DL (CALC)
MCH RBC QN AUTO: 27 PG (ref 27–33)
MCHC RBC AUTO-ENTMCNC: 31.5 G/DL (ref 32–36)
MCV RBC AUTO: 85.6 FL (ref 80–100)
NONHDLC SERPL-MCNC: 163 MG/DL (CALC)
PLATELET # BLD AUTO: 262 THOUSAND/UL (ref 140–400)
PMV BLD REES-ECKER: 11.4 FL (ref 7.5–12.5)
POTASSIUM SERPL-SCNC: 4.5 MMOL/L (ref 3.5–5.3)
PROT SERPL-MCNC: 7.4 G/DL (ref 6.1–8.1)
RBC # BLD AUTO: 4.78 MILLION/UL (ref 3.8–5.1)
SODIUM SERPL-SCNC: 139 MMOL/L (ref 135–146)
TRIGL SERPL-MCNC: 71 MG/DL
TSH SERPL-ACNC: 0.99 MIU/L (ref 0.4–4.5)
WBC # BLD AUTO: 3.6 THOUSAND/UL (ref 3.8–10.8)

## 2025-04-24 ENCOUNTER — TELEPHONE (OUTPATIENT)
Dept: PRIMARY CARE | Facility: CLINIC | Age: 70
End: 2025-04-24
Payer: MEDICARE

## 2025-04-25 DIAGNOSIS — E78.00 HYPERCHOLESTEROLEMIA: Primary | ICD-10-CM

## 2025-04-25 RX ORDER — ROSUVASTATIN CALCIUM 10 MG/1
10 TABLET, COATED ORAL NIGHTLY
Qty: 90 TABLET | Refills: 1 | Status: SHIPPED | OUTPATIENT
Start: 2025-04-25 | End: 2026-05-30

## 2025-05-04 NOTE — PROGRESS NOTES
"Location of visit: [facility] [place] [address] [place] [address]   Type of Visit: General Cardiology    Chief Complaint:  Patient was referred to Cardiology by Dr. Mcdonald for abnormal EKG on 4/21/25, found during wellness visit.    History Of Present Illness:    Richa Santoro is a 69 y.o. female, with history significant for HTN, HLD, uterine fibroids, who visits Cardiology today as a new patient  for abnormal EKG.        ECG 4/21 with incomplete RBBB, low voltage and prominent P wave in inferior leads    At this visit she reports some sensations of left sided chest discomfort after eating, occurring very uncommonly. She describes it as \"tightness\" or \"panic attack\" which only lasts a couple seconds then will self resolve when she takes deep breaths and after taking tums. Last experienced this 7 months ago. She would not describe this as chest pain per se.    For activity she does home exercises 4x per week. Walks for 4 hours weekly. She works part time in the service industry folding towels.  Her diet has recently improved, she's not eating wheeler as much. She does endorse frequent snacking on chips which she has been cutting back on recently. Eats salads, salmon, fish, chicken breast, raw vegetables.    Otherwise denies dyspnea on exertion, shortness of breath, orthopnea, PND, nocturia, edema, palpitations, dizziness, lightheadedness, syncope, claudication, or snoring/apnea.    Blood pressure: 131/88 mmHg  HR: 78 bpm    Today's ECG shows sinus rhythm at 78 bpm, again incomplete RBBB, and again prominent P waves in the inferior leads suggestive of right atrial enlargement  Past Medical History:  She has a past medical history of Abnormal Pap smear of cervix, Dermatitis venenata (03/01/2023), and Urticaria, chronic (03/01/2023).    Past Surgical History:  She has a past surgical history that includes Tubal ligation (04/02/2015); Other surgical history (06/18/2020); and Colposcopy (4/2024).    Social History:  She reports that she has never " "smoked. She has never used smokeless tobacco. She reports current alcohol use of about 1.0 standard drink of alcohol per week. She reports that she does not use drugs.  Non-smoker  1-2 drinks weekly  No illicit drug use    Family History:  Family History[1]  Siblings all with HTN  2 siblings with DM2  1 brother with frequent colon polyps  Other brother had cabg, DM2, HTN  1 sister with thyroid issues  Mom with CKD, HTN  Dad passed away from colon cancer    Allergies:  Shellfish containing products and Shellfish derived    Outpatient Medications:  Current Outpatient Medications   Medication Instructions    amLODIPine (NORVASC) 5 mg, oral, Daily    calcium carbonate (TUMS) 1,000 mg, Daily RT    cetirizine (ZYRTEC) 10 mg, As needed    cholecalciferol (Vitamin D-3) 25 MCG (1000 UT) tablet 1 tablet, Daily    multivitamin tablet 1 tablet, Daily    rosuvastatin (CRESTOR) 10 mg, oral, Nightly    triamcinolone (Kenalog) 0.1 % ointment 1 Application, Topical, 2 times daily     Last Recorded Vitals:  Vitals:    05/05/25 1005   BP: 131/88   BP Location: Left arm   Patient Position: Sitting   BP Cuff Size: Adult   Pulse: 86   SpO2: 93%     Physical Exam:      5/5/2025    10:05 AM 4/21/2025     9:50 AM 10/28/2024    12:35 PM 9/23/2024     8:23 AM 6/24/2024     4:26 PM 4/1/2024     1:40 PM   Vitals   Systolic 131 122 120 125  138   Diastolic 88 83 88 80  88   BP Location Left arm Right arm  Right arm  Right arm   Heart Rate 86     90   Temp  36.8 °C (98.2 °F)  36.6 °C (97.8 °F)  36.1 °C (96.9 °F)   Height  1.626 m (5' 4\") 1.626 m (5' 4\")  1.651 m (5' 5\") 1.651 m (5' 5\")   Weight (lb)  134 137 138 134.92 135   BMI  23 kg/m2 23.52 kg/m2 22.96 kg/m2 22.45 kg/m2 22.47 kg/m2   BSA (m2)  1.66 m2 1.67 m2 1.69 m2 1.68 m2 1.68 m2   Visit Report Report Report Report Report  Report     Wt Readings from Last 5 Encounters:   04/21/25 60.8 kg (134 lb)   10/28/24 62.1 kg (137 lb)   09/23/24 62.6 kg (138 lb)   06/24/24 61.2 kg (134 lb 14.7 oz) "   04/01/24 61.2 kg (135 lb)     General: Sitting up comfortably in chair; in no apparent distress.  HEENT: Normocephalic; atraumatic. Well hydrated.  Eyes: Anicteric sclera. Extraocular movement intact.  Neck: Supple; no thyromegaly; normal jugular venous pressure, no bruits.  Respiratory: Bilateral air entry equal. No wheezing.  Cardiovascular: Normal S1, S2; no murmurs auscultated.  Abdomen: Nondistended; nontender. (+) bowel sounds.  Extremities: No peripheral edema present. Pulses 2+ diffusely.  Neurological: Oriented to time, place, and person; nonfocal.  Psychiatric: Normal affect.     Last Labs Reviewed:  CBC -  Recent Labs     04/21/25  1040 04/01/24  1546 04/03/23  1338 04/04/22  1500 03/11/21  1334   WBC 3.6* 3.6* 3.7* 4.4 4.3*   HGB 12.9 12.4 12.6 12.7 11.6*   HCT 40.9 39.9 41.2 40.5 38.2    272 284 253 268   MCV 85.6 85 87 86 88     CMP -  Recent Labs     04/21/25  1040 04/01/24  1410 04/03/23  1338 04/04/22  1500 03/11/21  1334    141 140 142 143   K 4.5 4.3 4.5 4.3 4.2    105 103 102 105   CO2 29 25 26 30 26   ANIONGAP 8 15 16 14 16   BUN 10 12 12 11 14   CREATININE 0.78 0.79 0.79 0.71 0.73   EGFR 82 82  --   --   --    CALCIUM 9.8 9.6 9.7 10.0 9.5     Recent Labs     04/21/25  1040 04/01/24  1410 04/03/23  1338 04/04/22  1500 03/11/21  1334   ALBUMIN 4.7 4.4 4.4 4.7 4.6   ALKPHOS 78 64 63 66 65   ALT 10 11 8 11 12   AST 18 19 17 18 24   BILITOT 0.5 0.4 0.5 0.5 0.5     LIPID PANEL -   Recent Labs     04/21/25  1040 04/01/24  1410 04/03/23  1338 04/04/22  1500 03/11/21  1334   CHOL 247* 222* 213* 232* 206*   LDLF  --   --  119* 129* 120*   LDLCALC 146* 128*  --   --   --    HDL 84 82.9 80.7 90.6 77.0   TRIG 71 55 65 63 46     HEME/ENDO -  Recent Labs     04/21/25  1040 04/01/24  1410 04/03/23  1338 04/04/22  1500   TSH 0.99 1.29 0.63 1.57   HGBA1C 5.6  --   --   --      Last Cardiology/Imaging Tests Personally Reviewed (if images available) and Interpreted:  ECG:  Encounter Date:  04/21/25   ECG 12 lead (Clinic Performed)    Narrative    Normal sinus rhythm   Possible biatrial enlargement  Incomplete RBBB  Reduced voltage     Echocardiogram:  No results found.    Cath:  No results found.    Stress Test:  No results found.    Cardiac CT/MRI:  CT cardiac scoring wo IV contrast 06/23/2023  FINDINGS:  The score and distribution of calcium in the coronary arteries is as follows:  LM 0,  LAD 0,  LCx 0,  RCA 0,  Total 0    The visualized ascending thoracic aorta measures 3.3 cm in diameter. The heart is normal in size. No pericardial effusion is present.    CV RISK FACTORS:   # Hypertension: Last BP: 131/88.  # Hyperlipidemia: Last Tchol 247 / LDL No results found for requested labs within last 365 days. / HDL 84 / TRIG 71 (4/21/2025: 10:40 AM).  # Type II Diabetes Mellitus: Last A1c 5.6 (4/21/2025: 10:40 AM).  # Obesity: Last BMI:  .  # CKD: Last BUN/Cr (GFR): 10/0.78 (82), 4/21/2025: 10:40 AM.    ASCV RISK:  The 10-year ASCVD risk score (Henry LLANES, et al., 2019) is: 14.5%    Values used to calculate the score:      Age: 69 years      Sex: Female      Is Non- : Yes      Diabetic: No      Tobacco smoker: No      Systolic Blood Pressure: 131 mmHg      Is BP treated: Yes      HDL Cholesterol: 84 mg/dL      Total Cholesterol: 247 mg/dL    MELGAR 10 year risk of CHD event 2%, coronary age 56    Assessment:  Richa Santoro is a 69 y.o. female, with history significant for HTN, HLD, uterine fibroids, who visits Cardiology today as a new patient for abnormal EKG on 4/21/25 with incomplete RBBB and prominent P wave in inferior leads found during a wellness visit.  Assessment & Plan  Benign essential hypertension  - continue with amlodipine 5mg daily  Mixed hyperlipidemia  - Coronary calcium score of 0 in 2023  - last  4/2025  - continue crestor 10mg given significant family history of CVD  - repeat lipid panel in 3-4 months with PCP  Abnormal EKG  - repeat in clinic today shows  ECG shows sinus rhythm at 78 bpm, again incomplete RBBB, and again right atrial enlargement with low voltage  - patient with negative cardiac ROS  - further characterize via echo with LV strain    Patient will follow up with me in the Cardiology office once the results are available  I spent 55 minutes assessing the case between pre-charting, face-to-face patient interaction, and documentation    Roosevelt Gibbons MD       [1]   Family History  Problem Relation Name Age of Onset    Kidney disease Mother Brigette Boswell     Kidney failure Mother Brigette Boswell     Colon cancer Father Jeff Boswell     Diabetes Sibling      Other (HTN) Sibling      Diabetes Sister Cecilia Odonnelljifabrizio     Hypertension Brother Filipe Boswell

## 2025-05-05 ENCOUNTER — APPOINTMENT (OUTPATIENT)
Dept: CARDIOLOGY | Facility: CLINIC | Age: 70
End: 2025-05-05
Payer: MEDICARE

## 2025-05-05 VITALS — DIASTOLIC BLOOD PRESSURE: 88 MMHG | SYSTOLIC BLOOD PRESSURE: 131 MMHG | OXYGEN SATURATION: 93 % | HEART RATE: 86 BPM

## 2025-05-05 DIAGNOSIS — E78.2 MIXED HYPERLIPIDEMIA: ICD-10-CM

## 2025-05-05 DIAGNOSIS — R94.31 ABNORMAL EKG: ICD-10-CM

## 2025-05-05 DIAGNOSIS — I10 BENIGN ESSENTIAL HYPERTENSION: Primary | ICD-10-CM

## 2025-05-05 LAB
ATRIAL RATE: 78 BPM
P AXIS: 80 DEGREES
P OFFSET: 191 MS
P ONSET: 139 MS
PR INTERVAL: 170 MS
Q ONSET: 224 MS
QRS COUNT: 12 BEATS
QRS DURATION: 72 MS
QT INTERVAL: 380 MS
QTC CALCULATION(BAZETT): 433 MS
QTC FREDERICIA: 414 MS
R AXIS: 55 DEGREES
T AXIS: 71 DEGREES
T OFFSET: 414 MS
VENTRICULAR RATE: 78 BPM

## 2025-05-05 PROCEDURE — 3079F DIAST BP 80-89 MM HG: CPT | Performed by: STUDENT IN AN ORGANIZED HEALTH CARE EDUCATION/TRAINING PROGRAM

## 2025-05-05 PROCEDURE — 93005 ELECTROCARDIOGRAM TRACING: CPT | Performed by: STUDENT IN AN ORGANIZED HEALTH CARE EDUCATION/TRAINING PROGRAM

## 2025-05-05 PROCEDURE — G2211 COMPLEX E/M VISIT ADD ON: HCPCS | Performed by: STUDENT IN AN ORGANIZED HEALTH CARE EDUCATION/TRAINING PROGRAM

## 2025-05-05 PROCEDURE — 99212 OFFICE O/P EST SF 10 MIN: CPT | Performed by: STUDENT IN AN ORGANIZED HEALTH CARE EDUCATION/TRAINING PROGRAM

## 2025-05-05 PROCEDURE — 1123F ACP DISCUSS/DSCN MKR DOCD: CPT | Performed by: STUDENT IN AN ORGANIZED HEALTH CARE EDUCATION/TRAINING PROGRAM

## 2025-05-05 PROCEDURE — 99204 OFFICE O/P NEW MOD 45 MIN: CPT | Performed by: STUDENT IN AN ORGANIZED HEALTH CARE EDUCATION/TRAINING PROGRAM

## 2025-05-05 PROCEDURE — 1160F RVW MEDS BY RX/DR IN RCRD: CPT | Performed by: STUDENT IN AN ORGANIZED HEALTH CARE EDUCATION/TRAINING PROGRAM

## 2025-05-05 PROCEDURE — 1159F MED LIST DOCD IN RCRD: CPT | Performed by: STUDENT IN AN ORGANIZED HEALTH CARE EDUCATION/TRAINING PROGRAM

## 2025-05-05 PROCEDURE — 3075F SYST BP GE 130 - 139MM HG: CPT | Performed by: STUDENT IN AN ORGANIZED HEALTH CARE EDUCATION/TRAINING PROGRAM

## 2025-05-05 NOTE — ASSESSMENT & PLAN NOTE
- Coronary calcium score of 0 in 2023  - last  4/2025  - continue crestor 10mg given significant family history of CVD  - repeat lipid panel in 3-4 months with PCP

## 2025-05-05 NOTE — ASSESSMENT & PLAN NOTE
- repeat in clinic today shows ECG shows sinus rhythm at 78 bpm, again incomplete RBBB, and again right atrial enlargement with low voltage  - patient with negative cardiac ROS  - further characterize via echo with LV strain

## 2025-05-16 ENCOUNTER — HOSPITAL ENCOUNTER (OUTPATIENT)
Dept: CARDIOLOGY | Facility: CLINIC | Age: 70
Discharge: HOME | End: 2025-05-16
Payer: MEDICARE

## 2025-05-16 DIAGNOSIS — R94.31 ABNORMAL EKG: ICD-10-CM

## 2025-05-16 PROCEDURE — 93306 TTE W/DOPPLER COMPLETE: CPT

## 2025-05-19 ENCOUNTER — OFFICE VISIT (OUTPATIENT)
Dept: CARDIOLOGY | Facility: CLINIC | Age: 70
End: 2025-05-19
Payer: MEDICARE

## 2025-05-19 VITALS
WEIGHT: 135 LBS | HEART RATE: 90 BPM | OXYGEN SATURATION: 97 % | HEIGHT: 64 IN | SYSTOLIC BLOOD PRESSURE: 113 MMHG | DIASTOLIC BLOOD PRESSURE: 74 MMHG | BODY MASS INDEX: 23.05 KG/M2

## 2025-05-19 DIAGNOSIS — E78.00 HYPERCHOLESTEROLEMIA: ICD-10-CM

## 2025-05-19 DIAGNOSIS — I10 BENIGN ESSENTIAL HYPERTENSION: Primary | ICD-10-CM

## 2025-05-19 DIAGNOSIS — R94.31 ABNORMAL EKG: ICD-10-CM

## 2025-05-19 PROBLEM — E78.2 MIXED HYPERLIPIDEMIA: Status: RESOLVED | Noted: 2025-05-05 | Resolved: 2025-05-19

## 2025-05-19 LAB
AORTIC VALVE PEAK VELOCITY: 1.16 M/S
ATRIAL RATE: 78 BPM
AV PEAK GRADIENT: 5 MMHG
AVA (PEAK VEL): 2.35 CM2
EJECTION FRACTION APICAL 4 CHAMBER: 63.6
EJECTION FRACTION: 63 %
GLOBAL LONGITUDINAL STRAIN: 19.9 %
LEFT ATRIUM VOLUME AREA LENGTH INDEX BSA: 17.8 ML/M2
LEFT VENTRICLE INTERNAL DIMENSION DIASTOLE: 3.65 CM (ref 3.5–6)
LEFT VENTRICULAR OUTFLOW TRACT DIAMETER: 1.95 CM
MITRAL VALVE E/A RATIO: 0.59
P AXIS: 80 DEGREES
P OFFSET: 191 MS
P ONSET: 139 MS
PR INTERVAL: 170 MS
Q ONSET: 224 MS
QRS COUNT: 12 BEATS
QRS DURATION: 72 MS
QT INTERVAL: 380 MS
QTC CALCULATION(BAZETT): 433 MS
QTC FREDERICIA: 414 MS
R AXIS: 55 DEGREES
RIGHT VENTRICLE FREE WALL PEAK S': 10 CM/S
RIGHT VENTRICLE PEAK SYSTOLIC PRESSURE: 22.3 MMHG
T AXIS: 71 DEGREES
T OFFSET: 414 MS
TRICUSPID ANNULAR PLANE SYSTOLIC EXCURSION: 1.8 CM
VENTRICULAR RATE: 78 BPM

## 2025-05-19 PROCEDURE — 1159F MED LIST DOCD IN RCRD: CPT | Performed by: STUDENT IN AN ORGANIZED HEALTH CARE EDUCATION/TRAINING PROGRAM

## 2025-05-19 PROCEDURE — 99215 OFFICE O/P EST HI 40 MIN: CPT | Performed by: STUDENT IN AN ORGANIZED HEALTH CARE EDUCATION/TRAINING PROGRAM

## 2025-05-19 PROCEDURE — G2211 COMPLEX E/M VISIT ADD ON: HCPCS | Performed by: STUDENT IN AN ORGANIZED HEALTH CARE EDUCATION/TRAINING PROGRAM

## 2025-05-19 PROCEDURE — 1160F RVW MEDS BY RX/DR IN RCRD: CPT | Performed by: STUDENT IN AN ORGANIZED HEALTH CARE EDUCATION/TRAINING PROGRAM

## 2025-05-19 PROCEDURE — 1126F AMNT PAIN NOTED NONE PRSNT: CPT | Performed by: STUDENT IN AN ORGANIZED HEALTH CARE EDUCATION/TRAINING PROGRAM

## 2025-05-19 PROCEDURE — 99212 OFFICE O/P EST SF 10 MIN: CPT | Performed by: STUDENT IN AN ORGANIZED HEALTH CARE EDUCATION/TRAINING PROGRAM

## 2025-05-19 PROCEDURE — 3074F SYST BP LT 130 MM HG: CPT | Performed by: STUDENT IN AN ORGANIZED HEALTH CARE EDUCATION/TRAINING PROGRAM

## 2025-05-19 PROCEDURE — 3078F DIAST BP <80 MM HG: CPT | Performed by: STUDENT IN AN ORGANIZED HEALTH CARE EDUCATION/TRAINING PROGRAM

## 2025-05-19 PROCEDURE — 1036F TOBACCO NON-USER: CPT | Performed by: STUDENT IN AN ORGANIZED HEALTH CARE EDUCATION/TRAINING PROGRAM

## 2025-05-19 PROCEDURE — 3008F BODY MASS INDEX DOCD: CPT | Performed by: STUDENT IN AN ORGANIZED HEALTH CARE EDUCATION/TRAINING PROGRAM

## 2025-05-19 ASSESSMENT — ENCOUNTER SYMPTOMS
LOSS OF SENSATION IN FEET: 0
DEPRESSION: 0
OCCASIONAL FEELINGS OF UNSTEADINESS: 0

## 2025-05-19 ASSESSMENT — PATIENT HEALTH QUESTIONNAIRE - PHQ9
1. LITTLE INTEREST OR PLEASURE IN DOING THINGS: NOT AT ALL
SUM OF ALL RESPONSES TO PHQ9 QUESTIONS 1 AND 2: 0
2. FEELING DOWN, DEPRESSED OR HOPELESS: NOT AT ALL

## 2025-05-19 ASSESSMENT — COLUMBIA-SUICIDE SEVERITY RATING SCALE - C-SSRS
1. IN THE PAST MONTH, HAVE YOU WISHED YOU WERE DEAD OR WISHED YOU COULD GO TO SLEEP AND NOT WAKE UP?: NO
2. HAVE YOU ACTUALLY HAD ANY THOUGHTS OF KILLING YOURSELF?: NO
6. HAVE YOU EVER DONE ANYTHING, STARTED TO DO ANYTHING, OR PREPARED TO DO ANYTHING TO END YOUR LIFE?: NO

## 2025-05-19 ASSESSMENT — PAIN SCALES - GENERAL: PAINLEVEL_OUTOF10: 0-NO PAIN

## 2025-05-19 NOTE — PROGRESS NOTES
"Location of [place]: [facility] [place] [address] [place] [address]   Type of Visit: Established - First Seen: 5/5/2025     Chief Complaint:  Patient was referred to Cardiology for follow-up.    History Of Present Illness:    Chief Complaint:  Patient was referred to Cardiology by Dr. Mcdonald for abnormal EKG on 4/21/25, found during wellness visit.     History Of Present Illness:    Richa Santoro is a 69 y.o. female, with history significant for HTN, HLD, uterine fibroids, who visits Cardiology today as a follow-up visit  for abnormal EKG.          ECG 4/21 with incomplete RBBB, low voltage and prominent P wave in inferior leads.     At her prior visit she reported some sensations of left sided chest discomfort after eating, occurring very uncommonly. She described it as \"tightness\" or \"panic attack\" which only lasts a couple seconds then will self resolve when she takes deep breaths and after taking tums. Last experienced this 7 months ago. She would not describe this as chest pain per se. For activity she does home exercises 4x per week. Walks for 4 hours weekly. She works part time in the service industry folding towels.   Her diet has recently improved, she's not eating wheeler as much. She does endorse frequent snacking on chips which she has been cutting back on recently. Eats salads, salmon, fish, chicken breast, raw vegetables.     Otherwise denies dyspnea on exertion, shortness of breath, orthopnea, PND, nocturia, edema, palpitations, dizziness, lightheadedness, syncope, claudication, or snoring/apnea.    Transthoracic echocardiogram showed normal LV, normal LV strain, normal RV and normal RA size with RVSP within normal limits.     Blood pressure: 113/74 mmHg  HR: 90 bpm    Past Medical History:  She has a past medical history of Abnormal Pap smear of cervix, Dermatitis venenata (03/01/2023), and Urticaria, chronic (03/01/2023).    Past Surgical History:  She has a past surgical history that includes Tubal ligation (04/02/2015); Other " "surgical history (06/18/2020); and Colposcopy (4/2024).    Social History:  She reports that she has never smoked. She has never used smokeless tobacco. She reports current alcohol use of about 1.0 standard drink of alcohol per week. She reports that she does not use drugs.    Family History:  Problem Relation Name Age of Onset    Kidney disease Mother Brigette Boswell      Kidney failure Mother Brigette Boswell      Colon cancer Father Jeff Boswell      Diabetes Sibling        Other (HTN) Sibling        Diabetes Sister Cecilia Yung      Hypertension Brother Filipe Boswell       Allergies:  Shellfish containing products and Shellfish derived    Outpatient Medications:  Current Outpatient Medications   Medication Instructions    amLODIPine (NORVASC) 5 mg, oral, Daily    calcium carbonate (TUMS) 1,000 mg, Daily RT    cetirizine (ZYRTEC) 10 mg, As needed    cholecalciferol (Vitamin D-3) 25 MCG (1000 UT) tablet 1 tablet, Daily    multivitamin tablet 1 tablet, Daily    rosuvastatin (CRESTOR) 10 mg, oral, Nightly    triamcinolone (Kenalog) 0.1 % ointment 1 Application, Topical, 2 times daily     Last Recorded Vitals:  Vitals:    05/19/25 0851   BP: 113/74   BP Location: Left arm   Patient Position: Sitting   BP Cuff Size: Adult   Pulse: 90   SpO2: 97%   Weight: 61.2 kg (135 lb)   Height: 1.626 m (5' 4\")     Physical Exam:      5/19/2025     8:51 AM 5/5/2025    10:05 AM 4/21/2025     9:50 AM 10/28/2024    12:35 PM 9/23/2024     8:23 AM 6/24/2024     4:26 PM   Vitals   Systolic 113 131 122 120 125    Diastolic 74 88 83 88 80    BP Location Left arm Left arm Right arm  Right arm    Heart Rate 90 86       Temp   36.8 °C (98.2 °F)  36.6 °C (97.8 °F)    Height 1.626 m (5' 4\")  1.626 m (5' 4\") 1.626 m (5' 4\")  1.651 m (5' 5\")   Weight (lb) 135  134 137 138 134.92   BMI 23.17 kg/m2  23 kg/m2 23.52 kg/m2 22.96 kg/m2 22.45 kg/m2   BSA (m2) 1.66 m2  1.66 m2 1.67 m2 1.69 m2 1.68 m2   Visit Report Report Report Report Report " Report      Wt Readings from Last 5 Encounters:   05/19/25 61.2 kg (135 lb)   04/21/25 60.8 kg (134 lb)   10/28/24 62.1 kg (137 lb)   09/23/24 62.6 kg (138 lb)   06/24/24 61.2 kg (134 lb 14.7 oz)     General: Sitting up comfortably in chair; in no apparent distress.  HEENT: Normocephalic; atraumatic. Well hydrated.  Eyes: Anicteric sclera. Extraocular movement intact.  Neck: Supple; no thyromegaly; normal jugular venous pressure, no bruits.  Respiratory: Bilateral air entry equal. No wheezing.  Cardiovascular: Normal S1, S2; no murmurs auscultated.  Abdomen: Nondistended; nontender. (+) bowel sounds.  Extremities: No peripheral edema present. Pulses 2+ diffusely.  Neurological: Oriented to time, place, and person; nonfocal.  Psychiatric: Normal affect.     Last Labs Reviewed:  CBC -  Recent Labs     04/21/25  1040 04/01/24  1546 04/03/23  1338 04/04/22  1500 03/11/21  1334   WBC 3.6* 3.6* 3.7* 4.4 4.3*   HGB 12.9 12.4 12.6 12.7 11.6*   HCT 40.9 39.9 41.2 40.5 38.2    272 284 253 268   MCV 85.6 85 87 86 88     CMP -  Recent Labs     04/21/25  1040 04/01/24  1410 04/03/23  1338 04/04/22  1500 03/11/21  1334    141 140 142 143   K 4.5 4.3 4.5 4.3 4.2    105 103 102 105   CO2 29 25 26 30 26   ANIONGAP 8 15 16 14 16   BUN 10 12 12 11 14   CREATININE 0.78 0.79 0.79 0.71 0.73   EGFR 82 82  --   --   --    CALCIUM 9.8 9.6 9.7 10.0 9.5     Recent Labs     04/21/25  1040 04/01/24  1410 04/03/23  1338 04/04/22  1500 03/11/21  1334   ALBUMIN 4.7 4.4 4.4 4.7 4.6   ALKPHOS 78 64 63 66 65   ALT 10 11 8 11 12   AST 18 19 17 18 24   BILITOT 0.5 0.4 0.5 0.5 0.5     LIPID PANEL -   Recent Labs     04/21/25  1040 04/01/24  1410 04/03/23  1338 04/04/22  1500 03/11/21  1334   CHOL 247* 222* 213* 232* 206*   LDLF  --   --  119* 129* 120*   LDLCALC 146* 128*  --   --   --    HDL 84 82.9 80.7 90.6 77.0   TRIG 71 55 65 63 46     HEME/ENDO -  Recent Labs     04/21/25  1040 04/01/24  1410 04/03/23  1338 04/04/22  1500   TSH  0.99 1.29 0.63 1.57   HGBA1C 5.6  --   --   --      Last Cardiology/Imaging Tests Personally Reviewed (if images available) and Interpreted:  ECG:  Encounter Date: 05/05/25   ECG 12 lead (Clinic Performed)   Result Value    Ventricular Rate 78    Atrial Rate 78    CA Interval 170    QRS Duration 72    QT Interval 380    QTC Calculation(Bazett) 433    P Axis 80    R Axis 55    T Axis 71    QRS Count 12    Q Onset 224    P Onset 139    P Offset 191    T Offset 414    QTC Fredericia 414    Narrative    Normal sinus rhythm  Right atrial enlargement  Incomplete RBBB     Echocardiogram:  Recent Labs     05/16/25  1211   EF 63   LVIDD 3.65   RV 22.3   RVFRWALLPKSP 10.00   TAPSE 1.8     Transthoracic Echocardiogram 5/16/2025   1. The left ventricular systolic function is normal, with a Healy's biplane calculated ejection fraction of 63%.   2. There is normal right ventricular global systolic function.   3. Right ventricular systolic pressure is within normal limits.    Cath:  No results found.    Stress Test:  No results found.    Cardiac CT/MRI:  CT cardiac scoring wo IV contrast 06/23/2023  FINDINGS:  The score and distribution of calcium in the coronary arteries is as follows:  LM 0,  LAD 0,  LCx 0,  RCA 0,  Total 0     The visualized ascending thoracic aorta measures 3.3 cm in diameter. The heart is normal in size. No pericardial effusion is present.    CV RISK FACTORS:   # Hypertension: Last BP: 113/74.  # Hyperlipidemia: Last Tchol 247 / LDL No results found for requested labs within last 365 days. / HDL 84 / TRIG 71 (4/21/2025: 10:40 AM).  # Type II Diabetes Mellitus: Last A1c 5.6 (4/21/2025: 10:40 AM).  # Obesity: Last BMI: 23.16.  # CKD: Last BUN/Cr (GFR): 10/0.78 (82), 4/21/2025: 10:40 AM.    ASCV RISK:  The 10-year ASCVD risk score (Henry LLANES, et al., 2019) is: 10.9%    Values used to calculate the score:      Age: 69 years      Sex: Female      Is Non- : Yes      Diabetic: No       Tobacco smoker: No      Systolic Blood Pressure: 113 mmHg      Is BP treated: Yes      HDL Cholesterol: 84 mg/dL      Total Cholesterol: 247 mg/dL    Assessment:  69 y.o. female, with history significant for HTN, HLD, uterine fibroids, who visits Cardiology today as a follow-up visit  for abnormal EKG. Transthoracic echocardiogram within normal limits, without signs of infiltrative disease, RVSP elevation or right side overload. Blood pressure is well controlled on actual scheme and she is working on her diet to control HDL.   Assessment & Plan  Benign essential hypertension  - Continue treatment with amlodipine  - Blood pressure control at home  Hypercholesterolemia  - Continue low dose rosuvastatin  - Check calcium score every 5 years (2028) and adjust Crestor dose accordingly  Abnormal EKG  - Infiltrative disease or right side disease ruled out    No Cardiology follow up required, patient will return as needed  I spent 40 minutes assessing the case between pre-charting, face-to-face patient interaction, and documentation    Roosevelt Gibbons MD

## 2025-05-19 NOTE — ASSESSMENT & PLAN NOTE
- Continue low dose rosuvastatin  - Check calcium score every 5 years (2028) and adjust Crestor dose accordingly

## 2025-06-27 ENCOUNTER — HOSPITAL ENCOUNTER (OUTPATIENT)
Dept: RADIOLOGY | Facility: CLINIC | Age: 70
Discharge: HOME | End: 2025-06-27
Payer: MEDICARE

## 2025-06-27 VITALS — BODY MASS INDEX: 23.03 KG/M2 | WEIGHT: 134.92 LBS | HEIGHT: 64 IN

## 2025-06-27 DIAGNOSIS — Z12.31 ENCOUNTER FOR SCREENING MAMMOGRAM FOR MALIGNANT NEOPLASM OF BREAST: ICD-10-CM

## 2025-06-27 DIAGNOSIS — Z01.419 ENCOUNTER FOR GYNECOLOGICAL EXAMINATION WITHOUT ABNORMAL FINDING: ICD-10-CM

## 2025-06-27 PROCEDURE — 77063 BREAST TOMOSYNTHESIS BI: CPT
